# Patient Record
Sex: MALE | Race: BLACK OR AFRICAN AMERICAN | NOT HISPANIC OR LATINO | Employment: UNEMPLOYED | URBAN - METROPOLITAN AREA
[De-identification: names, ages, dates, MRNs, and addresses within clinical notes are randomized per-mention and may not be internally consistent; named-entity substitution may affect disease eponyms.]

---

## 2022-01-17 ENCOUNTER — HOSPITAL ENCOUNTER (EMERGENCY)
Facility: CLINIC | Age: 32
Discharge: HOME | End: 2022-01-17
Attending: PSYCHIATRY & NEUROLOGY | Admitting: PSYCHIATRY & NEUROLOGY

## 2022-01-17 VITALS
DIASTOLIC BLOOD PRESSURE: 42 MMHG | OXYGEN SATURATION: 99 % | SYSTOLIC BLOOD PRESSURE: 100 MMHG | RESPIRATION RATE: 16 BRPM | HEART RATE: 98 BPM | TEMPERATURE: 98.4 F

## 2022-01-17 DIAGNOSIS — Z59.00 LACK OF HOUSING: ICD-10-CM

## 2022-01-17 DIAGNOSIS — F11.23 OPIOID DEPENDENCE WITH WITHDRAWAL (H): ICD-10-CM

## 2022-01-17 DIAGNOSIS — Z56.0 UNEMPLOYMENT: ICD-10-CM

## 2022-01-17 LAB
AMPHETAMINES UR QL SCN: NORMAL
BARBITURATES UR QL: NORMAL
BENZODIAZ UR QL: NORMAL
CANNABINOIDS UR QL SCN: NORMAL
COCAINE UR QL: NORMAL
OPIATES UR QL SCN: NORMAL

## 2022-01-17 PROCEDURE — 99284 EMERGENCY DEPT VISIT MOD MDM: CPT | Performed by: PSYCHIATRY & NEUROLOGY

## 2022-01-17 PROCEDURE — 90791 PSYCH DIAGNOSTIC EVALUATION: CPT

## 2022-01-17 PROCEDURE — 99285 EMERGENCY DEPT VISIT HI MDM: CPT | Mod: 25

## 2022-01-17 PROCEDURE — 80307 DRUG TEST PRSMV CHEM ANLYZR: CPT | Performed by: PSYCHIATRY & NEUROLOGY

## 2022-01-17 PROCEDURE — 250N000013 HC RX MED GY IP 250 OP 250 PS 637: Performed by: PSYCHIATRY & NEUROLOGY

## 2022-01-17 RX ORDER — HYDROXYZINE HYDROCHLORIDE 25 MG/1
50 TABLET, FILM COATED ORAL ONCE
Status: COMPLETED | OUTPATIENT
Start: 2022-01-17 | End: 2022-01-17

## 2022-01-17 RX ORDER — GABAPENTIN 300 MG/1
300 CAPSULE ORAL ONCE
Status: COMPLETED | OUTPATIENT
Start: 2022-01-17 | End: 2022-01-17

## 2022-01-17 RX ADMIN — TIZANIDINE 4 MG: 4 TABLET ORAL at 18:16

## 2022-01-17 RX ADMIN — HYDROXYZINE HYDROCHLORIDE 50 MG: 25 TABLET, FILM COATED ORAL at 18:16

## 2022-01-17 RX ADMIN — GABAPENTIN 300 MG: 300 CAPSULE ORAL at 18:16

## 2022-01-17 SDOH — ECONOMIC STABILITY - HOUSING INSECURITY: HOMELESSNESS UNSPECIFIED: Z59.00

## 2022-01-17 SDOH — ECONOMIC STABILITY - INCOME SECURITY: UNEMPLOYMENT, UNSPECIFIED: Z56.0

## 2022-01-17 ASSESSMENT — ENCOUNTER SYMPTOMS
CARDIOVASCULAR NEGATIVE: 1
NEUROLOGICAL NEGATIVE: 1
DECREASED CONCENTRATION: 1
HALLUCINATIONS: 0
RESPIRATORY NEGATIVE: 1
HYPERACTIVE: 0
NERVOUS/ANXIOUS: 1
GASTROINTESTINAL NEGATIVE: 1
MUSCULOSKELETAL NEGATIVE: 1
EYES NEGATIVE: 1
CONSTITUTIONAL NEGATIVE: 1
ENDOCRINE NEGATIVE: 1

## 2022-01-17 NOTE — ED PROVIDER NOTES
[facility] EMERGENCY DEPARTMENT ([facility])    1/17/22     BEC 5      History     Chief Complaint   Patient presents with     Addiction Problem     Fentanyl everyday, about 5 pills a day. Last dose yesterday and wants detox.     Suicidal     HPI  Francisco Ni is a 31 year old male with history of suicidal ideation, opiate use disorder who presents seeking opiate detox. He states he has been using Fentanyl every day, using 5 tablets a day. He last dosed yesterday morning. He seeks opiate detox. Patient has not been in detox previously. He denies history of opiate maintenance treatment. He denies history of mental illness. He reports abusing fentanyl for 2 years, now using about 5 pills daily. He does not know the dose of each pill. He used to stay at his sister's place but got kicked out when she found out he had been using. He is presently homeless, unemployed and uninsured. He admits to feeling depressed about his current situation and has had intermittent SI. He denies intent or plan and comes here seeking help, preferably with detox. He admits to possibly having other drugs in his system. He is vague regarding his multiple drug use. He does not exhibit psychosis.    [facility] records reviewed, no COVID-19 vaccination documented.     Please see DEC Crisis Assessment on 1/17/22 in Epic for further details.    PERSONAL MEDICAL HISTORY  No past medical history on file.  PAST SURGICAL HISTORY  No past surgical history on file.  FAMILY HISTORY  No family history on file.  SOCIAL HISTORY  Social History     Tobacco Use     Smoking status: Not on file     Smokeless tobacco: Not on file   Substance Use Topics     Alcohol use: Not on file     MEDICATIONS  Current Facility-Administered Medications   Medication     gabapentin (NEURONTIN) capsule 300 mg     hydrOXYzine (VISTARIL) capsule 50 mg     tiZANidine (ZANAFLEX) tablet 4 mg     No current outpatient medications on file.     ALLERGIES  No Known Allergies        Review of Systems   Constitutional: Negative.    HENT: Negative.    Eyes: Negative.    Respiratory: Negative.    Cardiovascular: Negative.    Gastrointestinal: Negative.    Endocrine: Negative.    Genitourinary: Negative.    Musculoskeletal: Negative.    Skin: Negative.    Neurological: Negative.    Psychiatric/Behavioral: Positive for decreased concentration and suicidal ideas. Negative for hallucinations. The patient is nervous/anxious. The patient is not hyperactive.    All other systems reviewed and are negative.        Physical Exam   BP: (!) 150/82  Pulse: 112  Temp: 98.4  F (36.9  C)  Resp: 16  SpO2: 98 %  Physical Exam  Vitals and nursing note reviewed.   HENT:      Head: Normocephalic.   Eyes:      Pupils: Pupils are equal, round, and reactive to light.   Pulmonary:      Effort: Pulmonary effort is normal.   Musculoskeletal:         General: Normal range of motion.      Cervical back: Normal range of motion.   Neurological:      General: No focal deficit present.      Mental Status: He is alert.   Psychiatric:         Attention and Perception: Attention and perception normal. He does not perceive auditory or visual hallucinations.         Mood and Affect: Mood is depressed.         Speech: Speech normal.         Behavior: Behavior is withdrawn. Behavior is not agitated, aggressive, hyperactive or combative. Behavior is cooperative.         Thought Content: Thought content normal. Thought content is not paranoid or delusional. Thought content does not include homicidal or suicidal ideation.         Cognition and Memory: Cognition and memory normal.         Judgment: Judgment normal.         ED Course      Procedures         Mental Health Risk Assessment      PSS-3    Date and Time Over the past 2 weeks have you felt down, depressed, or hopeless? Over the past 2 weeks have you had thoughts of killing yourself? Have you ever attempted to kill yourself? When did this last happen? User   01/17/22 0234 yes yes  yes between 1 and 6 months ago TL      C-SSRS ([place])    Date and Time Q1 Wished to be Dead (Past Month) Q2 Suicidal Thoughts (Past Month) Q3 Suicidal Thought Method Q4 Suicidal Intent without Specific Plan Q5 Suicide Intent with Specific Plan Q6 Suicide Behavior (Lifetime) Within the Past 3 Months? RETIRED: Level of Risk per Screen Screening Not Complete User   01/17/22 1527 yes yes no no no yes -- -- -- TL              Suicide assessment completed by mental health (D.E.C., LCSW, etc.)       Results for orders placed or performed during the hospital encounter of 01/17/22   Drug abuse screen 1 urine (ED)     Status: Normal   Result Value Ref Range    Amphetamines Urine Screen Negative Screen Negative    Barbiturates Urine Screen Negative Screen Negative    Benzodiazepines Urine Screen Negative Screen Negative    Cannabinoids Urine Screen Negative Screen Negative    Cocaine Urine Screen Negative Screen Negative    Opiates Urine Screen Negative Screen Negative   Urine Drugs of Abuse Screen     Status: Normal    Narrative    The following orders were created for panel order Urine Drugs of Abuse Screen.  Procedure                               Abnormality         Status                     ---------                               -----------         ------                     Drug abuse screen 1 urin...[[id number]]  Normal              Final result                 Please view results for these tests on the individual orders.     Medications   hydrOXYzine (VISTARIL) capsule 50 mg (has no administration in time range)   tiZANidine (ZANAFLEX) tablet 4 mg (has no administration in time range)   gabapentin (NEURONTIN) capsule 300 mg (has no administration in time range)        Assessments & Plan (with Medical Decision Making)   Patient with active opiate dependence who is seeking help, preferably with detox and further treatment. I discussed with patient of the steps he needs to take. He was provided with resources for rule  25 and [facility] to get started on Suboxone. I hesitate to provide him with a Suboxone dose presently given his fentanyl use and risk for precipitated withdrawal. Instead he is given a doses of hydroxyzine, gabapentin and tizanidine. There are no available [facility] detox beds. Patient does not need a psychiatric admission. His best plan would be to show up at the [facility] tomorrow during walk-in hours to get started on opiate maintenance. He is open to looking into a shelter bed tonight. Patient can be discharged.    I have reviewed the nursing notes. I have reviewed the findings, diagnosis, plan and need for follow up with the patient.    New Prescriptions    No medications on file       Final diagnoses:   Opioid dependence with withdrawal (H)       --  Roger Bright MD  [facility] EMERGENCY DEPARTMENT  1/17/2022     Roger Bright MD  01/17/22 9867

## 2022-01-17 NOTE — ED NOTES
1/17/2022  Francisco Ni 1990     [facility] Crisis Assessment    Patient was assessed: in person  Patient location: [facility] Emergency Department       Referral Data and Chief Complaint  Francisco is a 31 year old who uses he/him pronouns. Patient presented to the ED alone and was referred to the ED by family/friends. Patient is presenting to the ED for the following concerns:Detox from fentanyl.      Informed Consent and Assessment Methods    Patient is his own guardian. Writer met with patient and explained the crisis assessment process, including applicable information disclosures and limits to confidentiality, assessed understanding of the process, and obtained consent to proceed with the assessment. Patient was observed to be able to participate in the assessment as evidenced by moderate engagement.. Assessment methods included conducting a formal interview with patient, review of medical records, collaboration with medical staff, and obtaining relevant collateral information from family and community providers when available.    Narrative Summary of Presenting Problem and Current Functioning  What led to the patient presenting for crisis services, factors that make the crisis life threatening or complex, stressors, how is this disrupting the patient's life, and how current functioning is in comparison to baseline. How is patient presenting during the assessment.     Patient is alert and oriented.His affect is flat. He presents with a depressed mood. He is cooperative and soft spoken. His eye contact is poor. There is no psychosis or thought disorder. Patient reports he was living with his sister until this past weekend when she kicked him out of her house. He acknowledges it was related to his substance use though there may also have been some aggression and other reasons. Patient has been using fentanyl daily for about two years. He takes about 5 pills per day. He did stop for  about a week a month ago. Patient reports he is feeling poor both mentally and physically. He reports passive suicidal thoughts, no plan or intent at this time. No history of suicide attempts. He denies any history of mental health diagnosis or treatment.     History of the Crisis  Duration of the current crisis, coping skills attempted to reduce the crisis, community resources used, and past presentations.    Patient has been taking fentanyl for about two years. He was last in treatment about 3 years ago. His sister kicked him out of her home this weekend so he is now homeless and hopeless.     Collateral Information  I offered to call patient's sister but he declined.     Risk Assessment    Risk of Harm to Self     ESS-6  1.a. Over the past 2 weeks, have you had thoughts of killing yourself? Yes  1.b. Have you ever attempted to kill yourself and, if yes, when did this last happen? No   2. Recent or current suicide plan? No   3. Recent or current intent to act on ideation? No  4. Lifetime psychiatric hospitalization? No  5. Pattern of excessive substance use? Yes  6. Current irritability, agitation, or aggression? No  Scoring note: BOTH 1a and 1b must be yes for it to score 1 point, if both are not yes it is zero. All others are 1 point per number. If all questions 1a/1b - 6 are no, risk is negligible. If one of 1a/1b is yes, then risk is mild. If either question 2 or 3, but not both, is yes, then risk is automatically moderate regardless of total score. If both 2 and 3 are yes, risk is automatically high regardless of total score.     Score: 1, mild risk    The patient has the following risk factors for suicide: substance abuse, depressive symptoms and other recent homelessness    Is the patient experiencing current suicidal ideation: Yes. Thoughts to kill self with no plan or intent    Is the patient engaging in preparatory suicide behaviors (formulating how to act on plan, giving away possessions, saying  goodbye, displaying dramatic behavior changes, etc)? No    Does the patient have access to firearms or other lethal means? no    The patient has the following protective factors: expresses desire to engage in treatment    Support system information: His sister was before this weekend. He does not have any other family in the area. His parents live in [place].    Patient strengths: Patient is asking for help.    Does the patient engage in non-suicidal self-injurious behavior (NSSI/SIB)? no    Is the patient vulnerable to sexual exploitation?  No    Is the patient experiencing abuse or neglect? no    Is the patient a vulnerable adult? No      Risk of Harm to Others  The patient has the following risk factors of harm to others: no risk factors identified    Does the patient have thoughts of harming others? No    Is the patient engaging in sexually inappropriate behavior?  no       Current Substance Abuse    Is there recent substance abuse? Substance type(s): Fentynal Frequency: daily Quantity: 5 pills Method: oral Duration: 2 years  Last use: yesterday    Was a urine drug screen or blood alcohol level obtained: Yes negative     CAGE AID  Have you felt you ought to cut down on your drinking or drug use?  Yes  Have people annoyed you by criticizing your drinking or drug use? Yes  Have you felt bad or guilty about your drinking or drug use? Yes  Have you ever had a drink or used drugs first thing in the morning to steady your nerves or to get rid of a hangover? Yes  Score: 4/4       Current Symptoms/Concerns    Symptoms  Attention, hyperactivity, and impulsivity symptoms present: No    Anxiety symptoms present: No      Appetite symptoms present: Yes: Loss of Appetite     Behavioral difficulties present: No     Cognitive impairment symptoms present: No    Depressive symptoms present: Yes Depressed mood, Excessive guilt , Feelings of helplessness , Feelings of hopelessness  and Thoughts of suicide/death      Eating disorder  symptoms present: No    Learning disabilities, cognitive challenges, and/or developmental disorder symptoms present: No     Manic/hypomanic symptoms present: No    Personality and interpersonal functioning difficulties present : No    Psychosis symptoms present: No      Sleep difficulties present: No    Substance abuse disorder symptoms present: Yes Substance(s) taken in larger amounts or over a longer period than intended, Persistent desire or unsuccessful efforts to cut down or control use, Cravings or strong desire to use, Recurrent substance use resulting in failure to fulfill major role obligations at school, work, or home, Continued substance use despite having persistent or recurrent social or interpersonal problems caused by or exacerbated by the use of substance(s), Important social, occupational, or recreational activities are given up or reduced because of substance use, Substance abuse is continued despite knowledge of having a persistent or recurrent physical or psychological problem that has been caused of exacerbated by substance use, Tolerance (increased amounts to obtain desired effect or diminished effect with the same amount of use) and Withdrawal     Trauma and stressor related symptoms present: No           Mental Status Exam   Affect: Blunted   Appearance: Appropriate    Attention Span/Concentration: Attentive?    Eye Contact: Variable   Fund of Knowledge: Appropriate    Language /Speech Content: Fluent   Language /Speech Volume: Soft and Normal    Language /Speech Rate/Productions: Normal    Recent Memory: Intact   Remote Memory: Intact   Mood: Depressed    Orientation to Person: Yes    Orientation to Place: Yes   Orientation to Time of Day: Yes    Orientation to Date: Yes    Situation (Do they understand why they are here?): Yes    Psychomotor Behavior: Normal    Thought Content: Delusions   Thought Form: Intact       Mental Health and Substance Abuse History    History  Current and  historical diagnoses or mental health concerns: Patient denies any previous mental health diagnosis.     Prior MH services (inpatient, programmatic care, outpatient, etc) : No    History of substance abuse: Yes Fentynal    Prior JALIL services (inpatient, programmatic care, detox, outpatient, etc) : Yes Out pt. treatment at [facility] 3 years ago.    History of commitment: No    Family history of MH/JALIL: Yes Addiction through out extended family. He states everyone except his sister.     Trauma history: No    Medication  Psychotropic medications: No    Current Care Team  Primary Care Provider: No    Psychiatrist: No    Therapist: No    : No    CTSS or ARMHS: No    ACT Team: No    Other: No    Release of Information  Was a release of information signed: No. Reason: no providers      Biopsychosocial Information    Socioeconomic Information  Current living situation: Patient was living with his sister until this weekend when she kicked him out.     Employment/income source: Donating plasma    Relevant legal issues: None    Cultural, Mormon, or spiritual influences on mental health care: None    Is the patient active in the  or a : No      Relevant Medical Concerns   Patient identifies concerns with completing ADLs? No     Patient can ambulate independently? Yes     Other medical concerns? No     History of concussion or TBI? No        Diagnosis  11.93  Opioid Use, unspecified, with withdrawal.      Therapeutic Intervention  The following therapeutic methodologies were employed when working with the patient: establishing rapport, active listening, assessing dimensions of crisis, identifying additional supports and alternative coping skills, establishing a discharge plan, safety planning and harm reduction. Patient response to intervention: limited.      Disposition  Recommended disposition: Rule 25/JALIL Assessment      Reviewed case and recommendations with attending provider. Attending  Name:       Attending concurs with disposition: Yes      Patient concurs with disposition: Yes      Guardian concurs with disposition: NA     Final disposition: Other: Referral to the [facility] .       Clinical Substantiation of Recommendations   Rationale with supporting factors for disposition and diagnosis.     Patient presents to the ED with symptoms of opioid withdrawal. His last use was yesterday and he is experiencing shaking and fatigue. He does not have any previous mental health diagnosis or treatment .Patient is now homeless adding to his low mood and motivation to stop using opioids.  He has a very difficult time describing mood and distinguishing whether his symptoms are related to withdrawal or an underlying mood disorder. He reports passive suicidal thoughts without a plan or intent, but is very vague when pushed for more commitment or safety response.     Assessment Details  Patient interview started at: 4:50 and completed at: 5:15.    Total duration spent on the patient case in minutes: .50 hrs     CPT code(s) utilized: 31091 - Psychotherapy for Crisis - 60 (30-74*) min       Aftercare and Safety Planning  Follow up plans with MH/JALIL services: Yes Pt. has an appointment tomorrow, 1/18/22 at 1:45 pm. at [facility].       Aftercare plan placed in the AVS and provided to patient: Yes. Given to patient by VANDANA Ronquillo      Aftercare Plan  If I am feeling unsafe or I am in a crisis, I will:   Contact my established care providers   Call the National Suicide Prevention Lifeline: 662.545.1219   Go to the nearest emergency room   Call 361     Warning signs that I or other people might notice when a crisis is developing for me: You start making plans for suicide and are not able to keep yourself safe.    Things I am able to do on my own to cope or help me feel better: Maintain sobriety. Attend a Narcotics Anonymous meeting for support and guidance  from other people who have opioid addiction.     Things that I am able to do with others to cope or help me better: The recovery clinic can help you manage withdrawal and explore treatment options. Or walk into a community Rule 25 clinic.     Changes I can make to support my mental health and wellness: Continue to work towards treatment and sobriety.      People in my life that I can ask for help: Sister, the [facility]. [place] [address] (call [phone] for an appointment).    Your [facility] has a mental health crisis team you can call 24/7: [facility] Mobile Crisis  [phone] (adults)  [phone] (children)    Other things that are important when I m in crisis: To reach out for help.      Additional resources and information:     [address] [place] [address] does not have any openings at this time but you can keep calling them to see if there have been any openings.  [phone].    Adult Shelter Line [phone].    Narcotics Anonymous  24 Hour Helpline    (329) [phone]    Crisis Lines  Crisis Text Line  Text 351957  You will be connected with a trained live crisis counselor to provide support.    National Hope Line  1.800.SUICIDE [7022982]      Community Resources  Fast Tracker  Linking people to mental health and substance use disorder resources  [employer]

## 2022-01-18 ENCOUNTER — TELEPHONE (OUTPATIENT)
Dept: BEHAVIORAL HEALTH | Facility: CLINIC | Age: 32
End: 2022-01-18

## 2022-01-18 NOTE — DISCHARGE INSTRUCTIONS
You are scheduled with the [facility] on this campus with:  Sarah Page MD  [address] [address] [phone]  1/18/2022 1:45 pm      In order to get into chemical dependency treatment you must follow these steps:  1.  Complete a Rule 25 assessment with any of the providers listed below.    [facility]. A Rule 25 (call [phone] for an appointment) is a meeting between a medical professional and a person that may have a substance abuse or chemical dependency problem. The evaluation occurs in [facility] and assesses your overall chemical health. The medical professional follows [place] state guidelines, as outlined in state statutes, subsection 25. Along with the guidelines all assessors follow, there is also a 16-page standardized interview form assessors use. The rule 25 assessment is also sometimes referred to as a chemical health evaluation or chemical dependency assessment. The assessment can take place inside [facility] or anywhere else in the [facility].    The medical professional you meet with typically is required to be a Licensed Alcohol and Drug Counselor and will be the person that interviews you about your alcohol or drug use. The questions asked during the appointment help to determine if a person should be recommended for drug education or treatment. At the end of the assessment, a recommendation letter with results is provided to you and and any other person who may need it - such as , judges, or corrections agents when the substance abuse evaluation is court ordered for DWI, DUI, or other criminal charges     [facility]  [phone]  [address] [place] [phone]  Patients with Medical Assistance from [facility] or [facility] are covered, otherwise you need to have a [facility] or private insurance.  Rule 25s are available at clinics in [place], [facility], and [place].  Call  ahead to schedule an appointment, no walk in availability.     [facility]  [phone]  [address] [address]  Assessments done Monday through Friday 8am-7pm.  Call to schedule an appointment, walk ins are accommodated if possible.  Insurance coverage is verified.  If insurance covers 80 percent or more a payment schedule may be arranged with the business office.     [facility]  [phone]  [address] [place] [address]  Call to schedule an assessment.  Locations include [facility] [place], [place], [place], and [place].    [place]  [phone] - Press 1  Call to schedule an assessment.  No walk in availability.  Only able to accept PMAP and private insurance.  Locations include [place], [place], [place], [place], [facility], [place], [facility], and [place].    Narcotics Anonymous  24 Hour Helpline    [phone]      Aftercare Plan  If I am feeling unsafe or I am in a crisis, I will:   Contact my established care providers   Call the National Suicide Prevention Lifeline: 512.978.6258   Go to the nearest emergency room   Call 693     Warning signs that I or other people might notice when a crisis is developing for me: You start making plans for suicide and are not able to keep yourself safe.    Things that I am able to do with others to cope or help me better: The recovery clinic can help you manage withdrawal and explore treatment options. Or walk into a community [place] [address] clinic.     Changes I can make to support my mental health and wellness: Continue to work towards treatment and sobriety.      People in my life that I can ask for help: Sister      Counts include 234 beds at the [facility] has a mental health crisis team you can call 24/7: [facility] Mobile Crisis  [phone] (adults)  [phone] (children)    Other things that are important  "when I m in crisis: To reach out for help.      Additional resources and information: [address] [place] [address] detox does not have any openings at this time but you can keep calling them to see if there have been any openings.  [phone].  Adult Shelter Line [phone].    Crisis Lines  Crisis Text Line  Text 838214  You will be connected with a trained live crisis counselor to provide support.    National Hope Line  1.800.SUICIDE [3370289]      Community Resources  Fast Tracker  Linking people to mental health and substance use disorder resources  [employer].org     [place] Mental Health Warm Line  Peer to peer support  Monday thru Saturday, 12 pm to 10 pm  [phone] or 6.502.315.8059  Text \"Support\" to 14896    National [place] [employer] Mental [employer]ness (TAYLOR)  821.224.1624 or 1.888.TAYLOR.HELPS        Mental Health Apps  My3  https://my[employer] Outdoorpp.org/    VirtualHopeBox  https://[employer].org/apps/virtual-hope-box/              "

## 2024-06-07 ENCOUNTER — ANCILLARY PROCEDURE (OUTPATIENT)
Dept: ULTRASOUND IMAGING | Facility: CLINIC | Age: 34
End: 2024-06-07
Attending: STUDENT IN AN ORGANIZED HEALTH CARE EDUCATION/TRAINING PROGRAM

## 2024-06-07 ENCOUNTER — APPOINTMENT (OUTPATIENT)
Dept: RADIOLOGY | Facility: CLINIC | Age: 34
End: 2024-06-07
Attending: STUDENT IN AN ORGANIZED HEALTH CARE EDUCATION/TRAINING PROGRAM

## 2024-06-07 ENCOUNTER — HOSPITAL ENCOUNTER (EMERGENCY)
Facility: CLINIC | Age: 34
Discharge: HOME | End: 2024-06-07
Attending: STUDENT IN AN ORGANIZED HEALTH CARE EDUCATION/TRAINING PROGRAM | Admitting: STUDENT IN AN ORGANIZED HEALTH CARE EDUCATION/TRAINING PROGRAM

## 2024-06-07 VITALS
SYSTOLIC BLOOD PRESSURE: 141 MMHG | OXYGEN SATURATION: 99 % | RESPIRATION RATE: 16 BRPM | WEIGHT: 165 LBS | HEART RATE: 89 BPM | DIASTOLIC BLOOD PRESSURE: 83 MMHG | TEMPERATURE: 97.8 F | BODY MASS INDEX: 25.9 KG/M2 | HEIGHT: 67 IN

## 2024-06-07 DIAGNOSIS — L03.113 CELLULITIS OF RIGHT WRIST: ICD-10-CM

## 2024-06-07 DIAGNOSIS — R55 SYNCOPE AND COLLAPSE: ICD-10-CM

## 2024-06-07 LAB
ANION GAP SERPL CALCULATED.3IONS-SCNC: 10 MMOL/L (ref 7–15)
ATRIAL RATE - MUSE: 84 BPM
BASOPHILS # BLD AUTO: 0 10E3/UL (ref 0–0.2)
BASOPHILS NFR BLD AUTO: 0 %
BUN SERPL-MCNC: 6.9 MG/DL (ref 6–20)
CALCIUM SERPL-MCNC: 9.9 MG/DL (ref 8.6–10)
CHLORIDE SERPL-SCNC: 102 MMOL/L (ref 98–107)
CREAT SERPL-MCNC: 1.01 MG/DL (ref 0.67–1.17)
DEPRECATED HCO3 PLAS-SCNC: 27 MMOL/L (ref 22–29)
DIASTOLIC BLOOD PRESSURE - MUSE: NORMAL MMHG
EGFRCR SERPLBLD CKD-EPI 2021: >90 ML/MIN/1.73M2
EOSINOPHIL # BLD AUTO: 0 10E3/UL (ref 0–0.7)
EOSINOPHIL NFR BLD AUTO: 0 %
ERYTHROCYTE [DISTWIDTH] IN BLOOD BY AUTOMATED COUNT: 13 % (ref 10–15)
GLUCOSE SERPL-MCNC: 104 MG/DL (ref 70–99)
HCT VFR BLD AUTO: 43.4 % (ref 40–53)
HGB BLD-MCNC: 14 G/DL (ref 13.3–17.7)
IMM GRANULOCYTES # BLD: 0.1 10E3/UL
IMM GRANULOCYTES NFR BLD: 1 %
INTERPRETATION ECG - MUSE: NORMAL
LYMPHOCYTES # BLD AUTO: 1.4 10E3/UL (ref 0.8–5.3)
LYMPHOCYTES NFR BLD AUTO: 8 %
MCH RBC QN AUTO: 28.3 PG (ref 26.5–33)
MCHC RBC AUTO-ENTMCNC: 32.3 G/DL (ref 31.5–36.5)
MCV RBC AUTO: 88 FL (ref 78–100)
MONOCYTES # BLD AUTO: 0.9 10E3/UL (ref 0–1.3)
MONOCYTES NFR BLD AUTO: 5 %
NEUTROPHILS # BLD AUTO: 14.2 10E3/UL (ref 1.6–8.3)
NEUTROPHILS NFR BLD AUTO: 85 %
NRBC # BLD AUTO: 0 10E3/UL
NRBC BLD AUTO-RTO: 0 /100
P AXIS - MUSE: 62 DEGREES
PLATELET # BLD AUTO: 486 10E3/UL (ref 150–450)
POTASSIUM SERPL-SCNC: 4.1 MMOL/L (ref 3.4–5.3)
PR INTERVAL - MUSE: 166 MS
QRS DURATION - MUSE: 80 MS
QT - MUSE: 352 MS
QTC - MUSE: 415 MS
R AXIS - MUSE: 51 DEGREES
RBC # BLD AUTO: 4.95 10E6/UL (ref 4.4–5.9)
SODIUM SERPL-SCNC: 139 MMOL/L (ref 135–145)
SYSTOLIC BLOOD PRESSURE - MUSE: NORMAL MMHG
T AXIS - MUSE: 18 DEGREES
TROPONIN T SERPL HS-MCNC: <6 NG/L
VENTRICULAR RATE- MUSE: 84 BPM
WBC # BLD AUTO: 16.7 10E3/UL (ref 4–11)

## 2024-06-07 PROCEDURE — 73110 X-RAY EXAM OF WRIST: CPT | Mod: RT

## 2024-06-07 PROCEDURE — 76882 US LMTD JT/FCL EVL NVASC XTR: CPT

## 2024-06-07 PROCEDURE — 36415 COLL VENOUS BLD VENIPUNCTURE: CPT | Performed by: STUDENT IN AN ORGANIZED HEALTH CARE EDUCATION/TRAINING PROGRAM

## 2024-06-07 PROCEDURE — 84484 ASSAY OF TROPONIN QUANT: CPT | Performed by: STUDENT IN AN ORGANIZED HEALTH CARE EDUCATION/TRAINING PROGRAM

## 2024-06-07 PROCEDURE — 99285 EMERGENCY DEPT VISIT HI MDM: CPT | Mod: 25

## 2024-06-07 PROCEDURE — 87040 BLOOD CULTURE FOR BACTERIA: CPT | Performed by: STUDENT IN AN ORGANIZED HEALTH CARE EDUCATION/TRAINING PROGRAM

## 2024-06-07 PROCEDURE — 82374 ASSAY BLOOD CARBON DIOXIDE: CPT | Performed by: STUDENT IN AN ORGANIZED HEALTH CARE EDUCATION/TRAINING PROGRAM

## 2024-06-07 PROCEDURE — 85025 COMPLETE CBC W/AUTO DIFF WBC: CPT | Performed by: STUDENT IN AN ORGANIZED HEALTH CARE EDUCATION/TRAINING PROGRAM

## 2024-06-07 PROCEDURE — 84295 ASSAY OF SERUM SODIUM: CPT | Performed by: STUDENT IN AN ORGANIZED HEALTH CARE EDUCATION/TRAINING PROGRAM

## 2024-06-07 PROCEDURE — 73130 X-RAY EXAM OF HAND: CPT | Mod: RT

## 2024-06-07 PROCEDURE — 250N000013 HC RX MED GY IP 250 OP 250 PS 637: Performed by: STUDENT IN AN ORGANIZED HEALTH CARE EDUCATION/TRAINING PROGRAM

## 2024-06-07 PROCEDURE — 93005 ELECTROCARDIOGRAM TRACING: CPT | Performed by: STUDENT IN AN ORGANIZED HEALTH CARE EDUCATION/TRAINING PROGRAM

## 2024-06-07 RX ORDER — CEPHALEXIN 500 MG/1
500 CAPSULE ORAL 4 TIMES DAILY
Qty: 28 CAPSULE | Refills: 0 | Status: SHIPPED | OUTPATIENT
Start: 2024-06-07 | End: 2024-06-14

## 2024-06-07 RX ORDER — CEPHALEXIN 500 MG/1
500 CAPSULE ORAL ONCE
Status: COMPLETED | OUTPATIENT
Start: 2024-06-07 | End: 2024-06-07

## 2024-06-07 RX ORDER — DOXYCYCLINE 100 MG/1
100 CAPSULE ORAL 2 TIMES DAILY
Qty: 14 CAPSULE | Refills: 0 | Status: SHIPPED | OUTPATIENT
Start: 2024-06-07 | End: 2024-06-14

## 2024-06-07 RX ORDER — DOXYCYCLINE 100 MG/1
100 CAPSULE ORAL ONCE
Status: COMPLETED | OUTPATIENT
Start: 2024-06-07 | End: 2024-06-07

## 2024-06-07 RX ADMIN — DOXYCYCLINE HYCLATE 100 MG: 100 CAPSULE ORAL at 15:30

## 2024-06-07 RX ADMIN — CEPHALEXIN 500 MG: 500 CAPSULE ORAL at 15:30

## 2024-06-07 ASSESSMENT — ACTIVITIES OF DAILY LIVING (ADL): ADLS_ACUITY_SCORE: 33

## 2024-06-07 ASSESSMENT — COLUMBIA-SUICIDE SEVERITY RATING SCALE - C-SSRS
2. HAVE YOU ACTUALLY HAD ANY THOUGHTS OF KILLING YOURSELF IN THE PAST MONTH?: NO
6. HAVE YOU EVER DONE ANYTHING, STARTED TO DO ANYTHING, OR PREPARED TO DO ANYTHING TO END YOUR LIFE?: NO
1. IN THE PAST MONTH, HAVE YOU WISHED YOU WERE DEAD OR WISHED YOU COULD GO TO SLEEP AND NOT WAKE UP?: NO

## 2024-06-07 NOTE — ED TRIAGE NOTES
Patient presents to ED via EMS after having a syncopal episode, he reports that the heat was on in his motel room since last night and it was quite hot in the room, also not drinking enough, reports doing Fentanyl last night and started doing that one month ago and would like help with detox, also has swelling to R hand for one week, unsure of injury, denies injecting street drugs, reports smoking them.  Janell Montgomery RN.......6/7/2024 12:04 PM     Triage Assessment (Adult)       Row Name 06/07/24 1202          Triage Assessment    Airway WDL WDL        Respiratory WDL    Respiratory WDL WDL        Skin Circulation/Temperature WDL    Skin Circulation/Temperature WDL WDL        Cardiac WDL    Cardiac WDL WDL        Peripheral/Neurovascular WDL    Peripheral Neurovascular WDL WDL        Cognitive/Neuro/Behavioral WDL    Cognitive/Neuro/Behavioral WDL WDL

## 2024-06-07 NOTE — ED PROVIDER NOTES
EMERGENCY DEPARTMENT ENCOUNTER      NAME: Francisco Ni  AGE: 33 year old male  YOB: 1990  MRN: [id number]  EVALUATION DATE & TIME: No admission date for patient encounter.    PCP: No Ref-Primary, Physician    ED PROVIDER: Nicanor Rincon MD      Chief Complaint   Patient presents with    Syncope    Hand Pain         FINAL IMPRESSION:  1. Cellulitis of right wrist    2. Syncope and collapse          ED COURSE & MEDICAL DECISION MAKING:    Pertinent Labs & Imaging studies reviewed. (See chart for details)  33 year old male presents to the Emergency Department for evaluation of syncope and right hand pain/wrist pain.    ED Course as of 06/07/24 1434   Fri Jun 07, 2024   1207 EKG shows a sinus rhythm at 84 beats a minute, normal axis, normal NM, QRS and QTc durations, no ST elevations, no acute ischemic ST or T wave changes, no signs of HOCM,  dysrhythmia, brugada   1251 Patient is a 33-year-old male with history of opiate use disorder who presents to the emergency department for evaluation after syncopal event.  Also incidentally notes some right hand swelling.  Patient states that this morning when he was either getting up from bed or going back to bed he had a syncopal event.  He is not entirely sure what happened but states he woke up on the ground.  Since then denies any lightheadedness dizziness or chest pain.  States that he did use fentanyl last night and has been using daily for the past month.  States that he smokes it and does not use any injection drugs and denies any prior history of injection drug use.  As for his hand pain he notes about 1 week of progressive right-sided hand swelling and pain related to this.  Denies any inciting injury or trauma..  Denies any fever.    On exam patient is well-appearing in no acute distress.  Hemodynamically stable and afebrile.  Heart and lungs are clear without any wheezes or rails.  Answers questions appropriately.  No facial droop.  Normal gait.   Symmetric strength in upper lower extremities, does have edema and erythema to the dorsum of the right wrist, able to flex and extend the fingers without any pain.  Also able to range of motion of the wrist without any significant pain.  2+ right radial pulse.  Several day old appearing abrasion to the right PIP knuckle but no local erythema around this, also has an abrasion to right thumbnail but no subungual hematoma, felon or paronychia.    Will obtain EKG to evaluate for potential underlying etiology of his syncope.  Suspect cellulitis as cause of his right hand swelling but will get x-rays to evaluate for underlying fracture.  Will also send off blood cultures.  As he is able to fully range of motion the wrist and no pain with passive extension or flexion of the fingers lower suspicion for septic joint or flexor tenosynovitis.   1425 X-rays demonstrate a minimally displaced oblique fracture of the tuft of the fourth distal phalanx no other fractures.  Does have some mild tenderness in this area but does not remember specific injury.  This was placed in a foam metal splint for comfort.  Performed a bedside ultrasound which does show a fluid collection consistent with abscess just below the skin.  Suspect this is a source of cellulitis.  Fluid collection appears superficial to tendons and vessels of the hand and wrist.    1.5 cc of 1% lidocaine with epinephrine was used to incise the skin in the 1 cm incision was made for incision and drainage.  Significant amount of purulence was drained from wrist abscess.  Patient tolerated procedure well.  Will cover empirically for cellulitis with doxycycline and Keflex.    At the end of my shift final disposition is pending labs to evaluate for any significant underlying metabolic derangement or abnormality that may contribute to his syncopal event today as exact events around syncope are somewhat unclear.    If labs are reassuring and patient otherwise remains stable  emergency without any recurrent syncopal events he should be safe for discharge home once this is resolved.     12:43 PM I met and evaluated the patient.       Medical Decision Making  Obtained supplemental history:Supplemental history obtained?: Other: nurse triage note  Reviewed external records: External records reviewed?: No  Care impacted by chronic illness:N/A  Care significantly affected by social determinants of health:Alcohol Abuse and/or Recreational Drug Use  Did you consider but not order tests?: Work up considered but not performed and documented in chart, if applicable  Did you interpret images independently?: Independent interpretation of ECG and images noted in documentation, when applicable.  Consultation discussion with other provider:Did you involve another provider (consultant, , pharmacy, etc.)?: No  Admission considered. Patient was signed out to the oncoming physician, disposition pending.        At the conclusion of the encounter I discussed the results of all of the tests and the disposition. The questions were answered. The patient or family acknowledged understanding and was agreeable with the care plan.     0 minutes of critical care time     MEDICATIONS GIVEN IN THE EMERGENCY:  Medications   doxycycline hyclate (VIBRAMYCIN) capsule 100 mg (has no administration in time range)   cephALEXin (KEFLEX) capsule 500 mg (has no administration in time range)       NEW PRESCRIPTIONS STARTED AT TODAY'S ER VISIT  Current Discharge Medication List        START taking these medications    Details   cephALEXin (KEFLEX) 500 MG capsule Take 1 capsule (500 mg) by mouth 4 times daily for 7 days  Qty: 28 capsule, Refills: 0      doxycycline hyclate (VIBRAMYCIN) 100 MG capsule Take 1 capsule (100 mg) by mouth 2 times daily for 7 days  Qty: 14 capsule, Refills: 0                =================================================================    HPI    Patient information was obtained from: Patient    Use  "of : N/A     Francisco Ni is a 33 year old male with a pertinent history of opiate dependence with withdrawal who presents to this ED by EMS for evaluation of syncope, hand/wrist swelling and pain.       Patient presented with syncope and hand pain. Per the nurse triage note, he was staying at a motel that was hot and he was not drinking enough liquids.Then the patient was getting out of bed in the morning (6/07/24) to go to the bathroom and remembers waking up on the floor after first becoming light headed and dizzy. After the incident he felt chest pain. Also notes that he had swelling for a week in his wrist. No cuts or breaks in the skin noted. The pain started in his thumb and spread up into the inside of his arm. He has been smoking fentanyl every day for the last month and is not in or has ever been to rehab and not on suboxone. Patient reports his last use was last night.    He also denies cough or fever. He denied injection of drugs or a history of falls/injuries. Patient denies any other medical problems. He denied any allergies to medication.. No other complaints at this time.      REVIEW OF SYSTEMS   Refer to the [facility]    PAST MEDICAL HISTORY:  No past medical history on file.    PAST SURGICAL HISTORY:  No past surgical history on file.        CURRENT MEDICATIONS:    cephALEXin (KEFLEX) 500 MG capsule  doxycycline hyclate (VIBRAMYCIN) 100 MG capsule        ALLERGIES:  No Known Allergies    FAMILY HISTORY:  No family history on file.    SOCIAL HISTORY:   Social History     Socioeconomic History    Marital status: Single       VITALS:  BP (!) 140/79   Pulse 89   Temp 97.8  F (36.6  C) (Temporal)   Resp 16   Ht 1.702 m (5' 7\")   Wt 74.8 kg (165 lb)   SpO2 98%   BMI 25.84 kg/m      PHYSICAL EXAM    Constitutional: Well developed, Well nourished, NAD,  HENT: Normocephalic, Atraumatic,  mucous membranes moist,   Neck- trachea midline, No stridor.    Eyes:EOMI, Conjunctiva normal, No " discharge.   Respiratory: Normal breath sounds, No respiratory distress, No wheezing.    Cardiovascular: Normal heart rate, Regular rhythm,  No murmurs,   Abdominal: Soft, No tenderness, No rebound or guarding.     Musculoskeletal: no deformity or malalignment,  Right hand swelling at the dorsum. Full ROM of wrist, no pain in passive ROM of the wrist or flexion extension of the fingers mild tenderness palpation of the distal phalanx of the right fourth digit  Integument: Warm, Dry. Erythema and redness to right wrist.  Neurologic: Alert & oriented x 3, answers questions appropriate, normal gait, symmetric strength in upper lower extremities bilaterally  Psychiatric: Affect normal, Cooperative.      LAB:  All pertinent labs reviewed and interpreted.  Results for orders placed or performed during the hospital encounter of 06/07/24   XR Wrist Right G/E 3 Views    Impression    IMPRESSION: Normal joint spaces and alignment. No acute fracture. Old healed fracture of the distal fifth metacarpal.   XR Hand Right G/E 3 Views    Impression    IMPRESSION: Mildly displaced oblique fracture of the tuft of the fourth distal phalanx. Old healed distal fifth metacarpal fracture. Normal joint spaces and alignment.    NOTE: ABNORMAL REPORT    THE DICTATION ABOVE DESCRIBES AN ABNORMALITY FOR WHICH FOLLOW-UP IS NEEDED.        RADIOLOGY:  Reviewed all pertinent imaging. Please see official radiology report.  XR Hand Right G/E 3 Views   Final Result   IMPRESSION: Mildly displaced oblique fracture of the tuft of the fourth distal phalanx. Old healed distal fifth metacarpal fracture. Normal joint spaces and alignment.      NOTE: ABNORMAL REPORT      THE DICTATION ABOVE DESCRIBES AN ABNORMALITY FOR WHICH FOLLOW-UP IS NEEDED.       XR Wrist Right G/E 3 Views   Final Result   IMPRESSION: Normal joint spaces and alignment. No acute fracture. Old healed fracture of the distal fifth metacarpal.      POC US SOFT TISSUE    (Results Pending)        EKG:        Impression: EKG shows a sinus rhythm at 84 beats a minute, normal axis, normal UT, QRS and QTc durations, no ST elevations, no acute ischemic ST or T wave changes, no signs of HOCM,  dysrhythmia, brugada        I have independently reviewed and interpreted the EKG(s) documented above.    PROCEDURES:     PROCEDURE: Emergency Department Limited Bedside Screening Ultrasound   TYPE:    ED LIMITED BEDSIDE SCREENING US - SOFT TISSUE   INDICATIONS: evaluation for abscess   PROCEDURE PROVIDER:   Nicanor Rincon    WINDOW AND FINDINGS: Linear probe used over the dorsal wrist, loculated hypoechoic fluid collection consistent with abscess   IMAGES SAVED AND STORED FOR ARCHIVE AND REVIEW: Yes          [facility] Documentation:   CMS Diagnoses:              I, Wilian Pérez , am serving as a scribe to document services personally performed by Nicanor Rincon MD based on my observation and the provider's statements to me. I, Nicanor Rincon MD, attest that Wilian Pérez  is acting in a scribe capacity, has observed my performance of the services and has documented them in accordance with my direction.    Nicanor Rincon MD  [facility] EMERGENCY ROOM  [address] [facility] [id number] [phone]      Nicanor Rincon MD  06/07/24 1808

## 2024-06-07 NOTE — ED NOTES
"Pt grabbed belongings and began walking down hallway. \"Where's the exit\". Notified pt waiting on blood work. \"Well my ride is here and I have to go\". Pt unwilling to wait to speak to provider. Provider updated.   "

## 2024-06-07 NOTE — ED PROVIDER NOTES
EMERGENCY DEPARTMENT PROGRESS NOTE         ED COURSE AND MEDICAL DECISION MAKING  Patient was signed out to me by Dr. Rincon at 2:32 PM      Francisco Ni is a 33 year old male who presents to the ER with complaints of syncope and wrist pain.  He was found to have an abscess on the dorsum of his right hand.  He underwent an I&D of this area.  Regarding his syncope, laboratory testing was ordered.  This is pending at the time of signout.    3:53 PM  The patient's laboratory testing has not yet returned but he left the emergency department, stating that his ride was here and that he could not wait any longer.  He was not willing to stay for further discussion or discharge paperwork.    LAB  Pertinent labs results reviewed   Results for orders placed or performed during the hospital encounter of 06/07/24   XR Wrist Right G/E 3 Views    Impression    IMPRESSION: Normal joint spaces and alignment. No acute fracture. Old healed fracture of the distal fifth metacarpal.   XR Hand Right G/E 3 Views    Impression    IMPRESSION: Mildly displaced oblique fracture of the tuft of the fourth distal phalanx. Old healed distal fifth metacarpal fracture. Normal joint spaces and alignment.    NOTE: ABNORMAL REPORT    THE DICTATION ABOVE DESCRIBES AN ABNORMALITY FOR WHICH FOLLOW-UP IS NEEDED.    CBC with platelets and differential   Result Value Ref Range    WBC Count 16.7 (H) 4.0 - 11.0 10e3/uL    RBC Count 4.95 4.40 - 5.90 10e6/uL    Hemoglobin 14.0 13.3 - 17.7 g/dL    Hematocrit 43.4 40.0 - 53.0 %    MCV 88 78 - 100 fL    MCH 28.3 26.5 - 33.0 pg    MCHC 32.3 31.5 - 36.5 g/dL    RDW 13.0 10.0 - 15.0 %    Platelet Count 486 (H) 150 - 450 10e3/uL    % Neutrophils 85 %    % Lymphocytes 8 %    % Monocytes 5 %    % Eosinophils 0 %    % Basophils 0 %    % Immature Granulocytes 1 %    NRBCs per 100 WBC 0 <1 /100    Absolute Neutrophils 14.2 (H) 1.6 - 8.3 10e3/uL    Absolute Lymphocytes 1.4 0.8 - 5.3 10e3/uL    Absolute Monocytes 0.9 0.0 -  1.3 10e3/uL    Absolute Eosinophils 0.0 0.0 - 0.7 10e3/uL    Absolute Basophils 0.0 0.0 - 0.2 10e3/uL    Absolute Immature Granulocytes 0.1 <=0.4 10e3/uL    Absolute NRBCs 0.0 10e3/uL         RADIOLOGY    Pertinent imaging reviewed   Please see official radiology report.  XR Hand Right G/E 3 Views    Result Date: 6/7/2024  EXAM: XR HAND RIGHT G/E 3 VIEWS LOCATION: [facility] DATE: 6/7/2024 INDICATION: pain and swelling of right wrist and hand, eval for fracture COMPARISON: Same day right wrist radiographs.     IMPRESSION: Mildly displaced oblique fracture of the tuft of the fourth distal phalanx. Old healed distal fifth metacarpal fracture. Normal joint spaces and alignment. NOTE: ABNORMAL REPORT THE DICTATION ABOVE DESCRIBES AN ABNORMALITY FOR WHICH FOLLOW-UP IS NEEDED.     XR Wrist Right G/E 3 Views    Result Date: 6/7/2024  EXAM: XR WRIST RIGHT G/E 3 VIEWS LOCATION: [facility] DATE: 6/7/2024 INDICATION: pain and swelling of right wrist and hand, eval for fracture COMPARISON: 10/22/2021     IMPRESSION: Normal joint spaces and alignment. No acute fracture. Old healed fracture of the distal fifth metacarpal.      FINAL IMPRESSION    1. Cellulitis of right wrist    2. Syncope and collapse                Mj Woods, DO  06/07/24 1555

## 2024-06-12 LAB
BACTERIA BLD CULT: NO GROWTH
BACTERIA BLD CULT: NO GROWTH

## 2025-07-30 ENCOUNTER — HOSPITAL ENCOUNTER (EMERGENCY)
Facility: CLINIC | Age: 35
Discharge: HOME | End: 2025-07-31
Attending: EMERGENCY MEDICINE
Payer: MEDICAID

## 2025-07-30 ENCOUNTER — APPOINTMENT (OUTPATIENT)
Dept: GENERAL RADIOLOGY | Facility: CLINIC | Age: 35
End: 2025-07-30
Attending: EMERGENCY MEDICINE
Payer: MEDICAID

## 2025-07-30 DIAGNOSIS — T50.902A PURPOSEFUL NON-SUICIDAL DRUG INGESTION, INITIAL ENCOUNTER (H): Primary | ICD-10-CM

## 2025-07-30 DIAGNOSIS — S22.31XA CLOSED FRACTURE OF ONE RIB OF RIGHT SIDE, INITIAL ENCOUNTER: ICD-10-CM

## 2025-07-30 PROCEDURE — 71101 X-RAY EXAM UNILAT RIBS/CHEST: CPT | Mod: RT

## 2025-07-30 PROCEDURE — 99285 EMERGENCY DEPT VISIT HI MDM: CPT | Performed by: EMERGENCY MEDICINE

## 2025-07-30 ASSESSMENT — COLUMBIA-SUICIDE SEVERITY RATING SCALE - C-SSRS
6. HAVE YOU EVER DONE ANYTHING, STARTED TO DO ANYTHING, OR PREPARED TO DO ANYTHING TO END YOUR LIFE?: NO
2. HAVE YOU ACTUALLY HAD ANY THOUGHTS OF KILLING YOURSELF IN THE PAST MONTH?: NO
1. IN THE PAST MONTH, HAVE YOU WISHED YOU WERE DEAD OR WISHED YOU COULD GO TO SLEEP AND NOT WAKE UP?: NO

## 2025-07-30 ASSESSMENT — ACTIVITIES OF DAILY LIVING (ADL)
ADLS_ACUITY_SCORE: 41
ADLS_ACUITY_SCORE: 41

## 2025-07-31 ENCOUNTER — APPOINTMENT (OUTPATIENT)
Dept: GENERAL RADIOLOGY | Facility: CLINIC | Age: 35
End: 2025-07-31
Attending: EMERGENCY MEDICINE
Payer: MEDICAID

## 2025-07-31 VITALS
SYSTOLIC BLOOD PRESSURE: 121 MMHG | HEART RATE: 71 BPM | OXYGEN SATURATION: 98 % | DIASTOLIC BLOOD PRESSURE: 71 MMHG | RESPIRATION RATE: 14 BRPM | TEMPERATURE: 97 F

## 2025-07-31 PROCEDURE — 73140 X-RAY EXAM OF FINGER(S): CPT | Mod: RT

## 2025-07-31 ASSESSMENT — ACTIVITIES OF DAILY LIVING (ADL)
ADLS_ACUITY_SCORE: 41